# Patient Record
Sex: FEMALE | Race: WHITE | NOT HISPANIC OR LATINO | URBAN - METROPOLITAN AREA
[De-identification: names, ages, dates, MRNs, and addresses within clinical notes are randomized per-mention and may not be internally consistent; named-entity substitution may affect disease eponyms.]

---

## 2024-10-09 ENCOUNTER — OFFICE VISIT (OUTPATIENT)
Dept: ORTHOPEDICS | Facility: CLINIC | Age: 59
End: 2024-10-09
Payer: MEDICAID

## 2024-10-09 ENCOUNTER — TELEPHONE (OUTPATIENT)
Dept: ORTHOPEDICS | Facility: CLINIC | Age: 59
End: 2024-10-09
Payer: MEDICAID

## 2024-10-09 VITALS — HEIGHT: 66 IN

## 2024-10-09 DIAGNOSIS — S46.012D TRAUMATIC INCOMPLETE TEAR OF LEFT ROTATOR CUFF, SUBSEQUENT ENCOUNTER: ICD-10-CM

## 2024-10-09 DIAGNOSIS — M25.512 LEFT SHOULDER PAIN, UNSPECIFIED CHRONICITY: Primary | ICD-10-CM

## 2024-10-09 PROBLEM — S46.012A TRAUMATIC INCOMPLETE TEAR OF LEFT ROTATOR CUFF: Status: ACTIVE | Noted: 2024-10-09

## 2024-10-09 PROCEDURE — 1159F MED LIST DOCD IN RCRD: CPT | Mod: CPTII,,, | Performed by: ORTHOPAEDIC SURGERY

## 2024-10-09 PROCEDURE — 99999 PR PBB SHADOW E&M-NEW PATIENT-LVL III: CPT | Mod: PBBFAC,,, | Performed by: ORTHOPAEDIC SURGERY

## 2024-10-09 PROCEDURE — 99204 OFFICE O/P NEW MOD 45 MIN: CPT | Mod: S$PBB,,, | Performed by: ORTHOPAEDIC SURGERY

## 2024-10-09 PROCEDURE — 99203 OFFICE O/P NEW LOW 30 MIN: CPT | Mod: PBBFAC,PN | Performed by: ORTHOPAEDIC SURGERY

## 2024-10-09 RX ORDER — FLUTICASONE PROPIONATE 50 MCG
1 SPRAY, SUSPENSION (ML) NASAL
COMMUNITY
Start: 2024-05-28

## 2024-10-09 RX ORDER — LORATADINE 10 MG/1
10 TABLET ORAL
COMMUNITY
Start: 2024-09-25

## 2024-10-09 RX ORDER — CEFAZOLIN SODIUM 2 G/50ML
2 SOLUTION INTRAVENOUS
OUTPATIENT
Start: 2024-10-09

## 2024-10-09 RX ORDER — TOPIRAMATE 100 MG/1
100 TABLET, FILM COATED ORAL
COMMUNITY

## 2024-10-09 RX ORDER — ERGOCALCIFEROL 1.25 MG/1
50000 CAPSULE ORAL
COMMUNITY
Start: 2024-10-03

## 2024-10-09 RX ORDER — TIZANIDINE 4 MG/1
4 TABLET ORAL NIGHTLY PRN
Qty: 30 TABLET | Refills: 2 | Status: SHIPPED | OUTPATIENT
Start: 2024-10-09 | End: 2025-01-07
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
## 2024-11-20 ENCOUNTER — TELEPHONE (OUTPATIENT)
Dept: ORTHOPEDICS | Facility: CLINIC | Age: 59
End: 2024-11-20
Payer: MEDICAID

## 2024-11-20 NOTE — TELEPHONE ENCOUNTER
----- Message from Malia sent at 11/20/2024  3:49 PM CST -----  Regarding: Call back  Contact: [phone]  Type:  Patient Returning Call    Who Called: PT   Who Left Message for Patient: Nurse   Does the patient know what this is regarding?: Yes   Would the patient rather a call back or a response via [facility]? Call back   Best Call Back Number: [phone]  Additional Information:  
Spoke with patient. Informed of appointment time change.   
home

## 2024-11-20 NOTE — TELEPHONE ENCOUNTER
Voicemail not set up/ unable to leave VM. Pts appointment time was moved up to 8:30 AM for her post op with Love Santos PA-C.

## 2024-12-24 NOTE — PROGRESS NOTES
Subjective:      Patient ID: Meredith Lyle is a 59 y.o. female.    Chief Complaint: No chief complaint on file.      HPI: Meredith Lyle is here for a 2 week postop visit.     Surgery: left shoulder scope   Date of Surgery: 12/11/2024 (2 weeks)  Surgeon: Dr. Aleman    She is doing well.   Pain has been controlled.   Post surgical complaints include:  None.   No n/v, fever, chills, SOB, leg pain, surgical site irritation.    Patient presents today with her cousin      Past Medical History:   Diagnosis Date    Allergies     Traumatic incomplete tear of left rotator cuff     Vitamin D deficiency        Current Outpatient Medications:     ergocalciferol (ERGOCALCIFEROL) 50,000 unit Cap, Take 50,000 Units by mouth every 7 days. Weekly Wednesday; instructed to hold for sx, Disp: , Rfl:     fluticasone propionate (FLONASE) 50 mcg/actuation nasal spray, 1 spray by Each Nostril route once daily., Disp: , Rfl:     multivit-min/iron/FA/vit K/lut (CENTRUM SILVER WOMEN ORAL), Take 2 tablets by mouth Daily. Gummies, instructed to hold for sx, Disp: , Rfl:     multivitamin with minerals (HAIR,SKIN AND NAILS ORAL), Take 2 tablets by mouth Daily. Gummies; instructed to hold for sx, Disp: , Rfl:     oxyCODONE-acetaminophen (PERCOCET) 7.5-325 mg per tablet, Take 1 tablet by mouth every 6 (six) hours as needed., Disp: 28 tablet, Rfl: 0    tiZANidine (ZANAFLEX) 4 MG tablet, Take 1 tablet (4 mg total) by mouth nightly as needed (pain)., Disp: 30 tablet, Rfl: 2    topiramate (TOPAMAX) 100 MG tablet, Take 100 mg by mouth once daily., Disp: , Rfl:   Review of patient's allergies indicates:  No Known Allergies    There were no vitals taken for this visit.    Review of Systems   Constitutional: Negative for chills and fever.   HENT:  Negative for congestion and sore throat.    Eyes:  Negative for discharge and double vision.   Cardiovascular:  Negative for chest pain, palpitations and syncope.   Respiratory:  Negative for cough and  shortness of breath.    Endocrine: Negative for cold intolerance and heat intolerance.   Skin:  Negative for dry skin and rash.   Musculoskeletal:  Positive for joint pain, joint swelling and stiffness.   Gastrointestinal:  Negative for abdominal pain, nausea and vomiting.   Neurological:  Negative for focal weakness, numbness and paresthesias.         Objective:    Ortho Exam   left shoulder:  Significant for 2 incisions.   Wound margins are well approximated and healing nicely.   Sutures present  No redness, warmth, drainage, or other signs of infection.   minimal swelling.   PROM shoulder ABD 85, FE 85, ER 30.   AROM shoulder not tested    Sensation distally intact.   Pulses present.    GEN: Well developed, well nourished. AAOX3. No acute distress.   Breathing unlabored.  Mood and affect pleasant.     Imaging: None      Assessment/Plan:       1. S/P arthroscopy of left shoulder    2. Left shoulder pain, unspecified chronicity    3. Traumatic incomplete tear of left rotator cuff, subsequent encounter            Wound care: Sutures removed today at bedside. Regular wound care explained with soap and water.   Pain Management: continue current pain regimen  I explained symptoms will continue to resolve with time.  DME:  Okay to discontinue sling as tolerated  Therapy: orders placed today for PT  HEP 91205: Love Santos PA-C, instructed and demonstrated a shoulder ROM HEP. The patient then demonstrated understanding of exercises and proper technique. This program was performed for 10 minutes.   Work status: Okay to eat, write, do computer work. No lifting, overhead activities, pushing, pulling.  Okay to actively use arm as tolerated  Follow up: in 4 weeks with Love Santos PA-C or Dr Aleman    All of the patient's questions were answered and the patient will contact us if they have any questions or concerns in the interim.      Love Santos PA-C  [facility]  Orthopedic Surgery

## 2024-12-26 ENCOUNTER — OFFICE VISIT (OUTPATIENT)
Dept: ORTHOPEDICS | Facility: CLINIC | Age: 59
End: 2024-12-26
Payer: MEDICAID

## 2024-12-26 VITALS — HEIGHT: 66 IN | BODY MASS INDEX: 27.07 KG/M2 | WEIGHT: 168.44 LBS

## 2024-12-26 DIAGNOSIS — Z98.890 S/P ARTHROSCOPY OF LEFT SHOULDER: Primary | ICD-10-CM

## 2024-12-26 DIAGNOSIS — M25.512 LEFT SHOULDER PAIN, UNSPECIFIED CHRONICITY: ICD-10-CM

## 2024-12-26 DIAGNOSIS — S46.012D TRAUMATIC INCOMPLETE TEAR OF LEFT ROTATOR CUFF, SUBSEQUENT ENCOUNTER: ICD-10-CM

## 2024-12-26 PROCEDURE — 99213 OFFICE O/P EST LOW 20 MIN: CPT | Mod: PBBFAC,PN

## 2024-12-26 PROCEDURE — 99999 PR PBB SHADOW E&M-EST. PATIENT-LVL III: CPT | Mod: PBBFAC,,,

## 2025-01-10 ENCOUNTER — CLINICAL SUPPORT (OUTPATIENT)
Dept: REHABILITATION | Facility: HOSPITAL | Age: 60
End: 2025-01-10
Payer: MEDICAID

## 2025-01-10 DIAGNOSIS — Z98.890 S/P ARTHROSCOPY OF LEFT SHOULDER: ICD-10-CM

## 2025-01-10 DIAGNOSIS — M25.512 LEFT SHOULDER PAIN, UNSPECIFIED CHRONICITY: ICD-10-CM

## 2025-01-10 DIAGNOSIS — R68.89 DECREASED FUNCTIONAL ACTIVITY TOLERANCE: ICD-10-CM

## 2025-01-10 DIAGNOSIS — M25.612 DECREASED ROM OF LEFT SHOULDER: Primary | ICD-10-CM

## 2025-01-10 DIAGNOSIS — S46.012D TRAUMATIC INCOMPLETE TEAR OF LEFT ROTATOR CUFF, SUBSEQUENT ENCOUNTER: ICD-10-CM

## 2025-01-10 DIAGNOSIS — M62.81 MUSCLE WEAKNESS OF LEFT UPPER EXTREMITY: ICD-10-CM

## 2025-01-10 PROCEDURE — 97110 THERAPEUTIC EXERCISES: CPT | Mod: PN

## 2025-01-10 PROCEDURE — 97161 PT EVAL LOW COMPLEX 20 MIN: CPT | Mod: PN
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
## 2025-01-28 NOTE — PROGRESS NOTES
"Subjective:      Patient ID: Meredith Lyle is a 59 y.o. female.    Chief Complaint: Post-op Evaluation of the Left Shoulder      HPI: Meredith Lyle is here for a 2 week postop visit.     Surgery: left shoulder scope   Date of Surgery: 12/11/2024 (6 weeks)  Surgeon: Dr. Aleman    She is doing well.   Pain has been controlled.   Post surgical complaints include: decreased ROM, although continues improve  No n/v, fever, chills, SOB, leg pain, surgical site irritation.        Past Medical History:   Diagnosis Date    Allergies     Traumatic incomplete tear of left rotator cuff     Vitamin D deficiency        Current Outpatient Medications:     ergocalciferol (ERGOCALCIFEROL) 50,000 unit Cap, Take 50,000 Units by mouth every 7 days. Weekly Wednesday; instructed to hold for sx, Disp: , Rfl:     fluticasone propionate (FLONASE) 50 mcg/actuation nasal spray, 1 spray by Each Nostril route once daily., Disp: , Rfl:     multivit-min/iron/FA/vit K/lut (CENTRUM SILVER WOMEN ORAL), Take 2 tablets by mouth Daily. Gummies, instructed to hold for sx, Disp: , Rfl:     multivitamin with minerals (HAIR,SKIN AND NAILS ORAL), Take 2 tablets by mouth Daily. Gummies; instructed to hold for sx, Disp: , Rfl:     oxyCODONE-acetaminophen (PERCOCET) 7.5-325 mg per tablet, Take 1 tablet by mouth every 6 (six) hours as needed., Disp: 28 tablet, Rfl: 0    topiramate (TOPAMAX) 100 MG tablet, Take 100 mg by mouth once daily., Disp: , Rfl:   Review of patient's allergies indicates:  No Known Allergies    Ht 5' 6" (1.676 m)   Wt 76.4 kg (168 lb 6.9 oz)   BMI 27.19 kg/m²     Review of Systems   Constitutional: Negative for chills and fever.   HENT:  Negative for congestion and sore throat.    Eyes:  Negative for discharge and double vision.   Cardiovascular:  Negative for chest pain, palpitations and syncope.   Respiratory:  Negative for cough and shortness of breath.    Endocrine: Negative for cold intolerance and heat intolerance.   Skin:  " Negative for dry skin and rash.   Musculoskeletal:  Positive for joint pain, joint swelling and stiffness.   Gastrointestinal:  Negative for abdominal pain, nausea and vomiting.   Neurological:  Negative for focal weakness, numbness and paresthesias.         Objective:    Ortho Exam   left shoulder:  Significant for 2 incisions.   Wound margins are well approximated and healing nicely.   No redness, warmth, drainage, or other signs of infection.   minimal swelling.   AROM shoulder , , ER 30.   Sensation distally intact.   Pulses present.    GEN: Well developed, well nourished. AAOX3. No acute distress.   Breathing unlabored.  Mood and affect pleasant.     Imaging: None      Assessment/Plan:       1. S/P arthroscopy of left shoulder    2. Left shoulder pain, unspecified chronicity    3. Traumatic incomplete tear of left rotator cuff, subsequent encounter          Wound care: Regular wound care explained with soap and water.   Pain Management: continue current pain regimen  I explained symptoms will continue to resolve with time.  Therapy: continue PT  Work status: ok to increase activity as tolerated within limits of pain  Follow up: in 6 weeks     All of the patient's questions were answered and the patient will contact us if they have any questions or concerns in the interim.      Love Santos PA-C  [facility]  Orthopedic Surgery

## 2025-01-30 ENCOUNTER — OFFICE VISIT (OUTPATIENT)
Dept: ORTHOPEDICS | Facility: CLINIC | Age: 60
End: 2025-01-30
Payer: MEDICAID

## 2025-01-30 VITALS — HEIGHT: 66 IN | BODY MASS INDEX: 27.07 KG/M2 | WEIGHT: 168.44 LBS

## 2025-01-30 DIAGNOSIS — S46.012D TRAUMATIC INCOMPLETE TEAR OF LEFT ROTATOR CUFF, SUBSEQUENT ENCOUNTER: ICD-10-CM

## 2025-01-30 DIAGNOSIS — M25.512 LEFT SHOULDER PAIN, UNSPECIFIED CHRONICITY: ICD-10-CM

## 2025-01-30 DIAGNOSIS — Z98.890 S/P ARTHROSCOPY OF LEFT SHOULDER: Primary | ICD-10-CM

## 2025-01-30 PROCEDURE — 99999 PR PBB SHADOW E&M-EST. PATIENT-LVL III: CPT | Mod: PBBFAC,,,

## 2025-01-30 PROCEDURE — 99213 OFFICE O/P EST LOW 20 MIN: CPT | Mod: PBBFAC,PN

## 2025-02-13 ENCOUNTER — CLINICAL SUPPORT (OUTPATIENT)
Dept: REHABILITATION | Facility: HOSPITAL | Age: 60
End: 2025-02-13
Payer: MEDICAID

## 2025-02-13 DIAGNOSIS — R68.89 DECREASED FUNCTIONAL ACTIVITY TOLERANCE: ICD-10-CM

## 2025-02-13 DIAGNOSIS — M62.81 MUSCLE WEAKNESS OF LEFT UPPER EXTREMITY: ICD-10-CM

## 2025-02-13 DIAGNOSIS — M25.612 DECREASED ROM OF LEFT SHOULDER: Primary | ICD-10-CM

## 2025-02-13 PROCEDURE — 97110 THERAPEUTIC EXERCISES: CPT | Mod: PN,CQ

## 2025-02-13 NOTE — PROGRESS NOTES
"[facility]   Physical Therapy Treatment Note     Name: Meredith Lyle  Clinic Number: [id number]    Therapy Diagnosis:   Encounter Diagnoses   Name Primary?    Decreased ROM of left shoulder Yes    Muscle weakness of left upper extremity     Decreased functional activity tolerance      Physician: Love Santos PA-C    Visit Date: 2/13/2025    Physician Orders: PT Eval and Treat   Medical Diagnosis from Referral:   M25.512 (ICD-10-CM) - Left shoulder pain, unspecified chronicity   S46.012D (ICD-10-CM) - Traumatic incomplete tear of left rotator cuff, subsequent encounter   Z98.890 (ICD-10-CM) - S/P arthroscopy of left shoulder      Evaluation Date: 1/10/2025  Authorization Period Expiration: 12/31/25  Plan of Care Expiration: 5/10/25  Visit # / Visits authorized: 1/ 20    Progress Note Due: 2/10/25  FOTO: 1/1     Precautions: Standard  Date of Procedure: 12/11/2024, 9 weeks and 1 days post-op  Procedure: Procedure(s) (LRB):  ARTHROSCOPY, SHOULDER, WITH SUBACROMIAL SPACE DECOMPRESSION (Left)    Visit #: 1 of 20  PTA Visit #: 1/5  Time In: 9:00 am  Time Out: 9:55 am  Total Billable Time: 55 minutes    Subjective     Pt reports: feeling fine, not much pain.  She was compliant with home exercise program.  Response to previous treatment: first treatment day  Functional change: ongoing    Pain: 1-2/10  Location: left shoulder      Objective     Meredith received therapeutic exercises to develop strength, endurance, ROM, flexibility, and posture for 55 minutes including:  Pulley Flexion 3 min x 3 sec  Pulley Scaption 3 min x 3 sec  Wall slide (pillow case thumb up) 2x10x3"  Standing shoulder scaption 2# LUE (thumb up)  Supine Shoulder Flexion with 2# wand 3x10  Supine Shoulder Chest Press with 2# wand 3x10  Prone Row with 2# dumbbell 2x10  Prone T with 2# dumbbell 2x10  Prone I with 2# dumbbell 2x10  Prone Y 2x10  manual therapy techniques: Joint mobilizations and Soft tissue Mobilization were " applied to the: Left shoulder including: PROM Flex/Abd/ER/IR, Joint mobs, Oscillation        Home Exercises Provided and Patient Education Provided     Written Home Exercises Provided: Patient instructed to cont prior HEP.  Exercises were reviewed and Meredith was able to demonstrate them prior to the end of the session.  Meredith demonstrated good  understanding of the education provided.     Education provided:    HEP    Assessment     Patient came for her first therapy treatment after the initial evaluation on 1/10/2025, reporting Left shoulder has been feeling fine, not much pain, she has been working on HEP. Focus on improving flexibility/ROM/muscles strength for functional mobility and pain relief. Verbal and tactile instructions to ensure patient perform all exercises with good form, proper technique, and muscle activation. Reporting some muscle soreness post treatment. Instructed her to cont to work on her HEP, she verbalized understanding. Will continue to progress per patient tolerance.      Meredith Is progressing well towards her goals.   Pt prognosis is Good.     Pt will continue to benefit from skilled outpatient physical therapy to address the deficits listed in the problem list box on initial evaluation, provide pt/family education and to maximize pt's level of independence in the home and community environment.     Pt's spiritual, cultural and educational needs considered and pt agreeable to plan of care and goals.     Anticipated barriers to physical therapy: none    Goals:   Short Term Goals: 4 weeks  1.Report decreased in pain at worse less than  <   / =  4  /10  to increase tolerance for functional mobility. On going  2. Pt to improve L shoulder range of motion by 25% to allow for improved functional mobility to allow for improvement in IADLs. On going  3. Increased L UE MMT 1/2 grade to increase tolerance for ADL and work activities. On going  4. Pt to report be conscious of impaired sitting and  standing posture daily to decrease pain. On going  5. Pt to tolerate HEP to improve ROM and independence with ADL's. On going     Long Term Goals: 8 weeks  1.Report decreased in pain at worse less than  <   / =  2  /10  to increase tolerance for functional mobility. On going  2.  Patient will demonstrate improved overall function per FOTO Survey to CI = at least 1% but < 20% impaired, limited or restricted score or less.On going  3.Increased L UE MMT 1 grade to increase tolerance for ADL and work activities.On going  4. Pt to report and demonstrate proper posture in standing and sitting to decrease pain. On going  5. Pt to be Independent with HEP to improve ROM and independence with ADL's.On going  6. Pt to improve L shoulder range of motion by 75% to allow for improved functional mobility to allow for improvement in IADLs. On going       Plan     Plan of care Certification: 1/10/2025 to 5/10/25.     Outpatient Physical Therapy 2 times weekly for 12 weeks to include the following interventions: Electrical Stimulation , Manual Therapy, Moist Heat/ Ice, Neuromuscular Re-ed, Patient Education, Self Care, Therapeutic Activities, and Therapeutic Exercise. Dry needling    PT/PTA met face to face to discuss pt's treatment plan and progress towards established goals. Pt will be seen by a physical therapist minimally every 6th visit or every 30 days.      David To, PTA   2/13/2025

## 2025-02-15 ENCOUNTER — DOCUMENTATION ONLY (OUTPATIENT)
Dept: REHABILITATION | Facility: HOSPITAL | Age: 60
End: 2025-02-15
Payer: MEDICAID

## 2025-02-15 NOTE — PROGRESS NOTES
[facility]                                 PT/PTA conferance        PT/PTA met face to face to discuss pt's treatment plan and progress towards established goals. Pt will be seen by a physical therapist minimally every 6th visit or every 30 days.      David To, PTA  2/15/2025

## 2025-02-20 ENCOUNTER — PATIENT MESSAGE (OUTPATIENT)
Dept: ORTHOPEDICS | Facility: CLINIC | Age: 60
End: 2025-02-20
Payer: MEDICAID

## 2025-03-20 ENCOUNTER — OFFICE VISIT (OUTPATIENT)
Dept: ORTHOPEDICS | Facility: CLINIC | Age: 60
End: 2025-03-20
Payer: MEDICAID

## 2025-03-20 ENCOUNTER — DOCUMENTATION ONLY (OUTPATIENT)
Dept: REHABILITATION | Facility: HOSPITAL | Age: 60
End: 2025-03-20
Payer: MEDICAID

## 2025-03-20 DIAGNOSIS — G89.29 CHRONIC LEFT SHOULDER PAIN: ICD-10-CM

## 2025-03-20 DIAGNOSIS — M25.512 CHRONIC LEFT SHOULDER PAIN: ICD-10-CM

## 2025-03-20 DIAGNOSIS — Z98.890 S/P ARTHROSCOPY OF LEFT SHOULDER: Primary | ICD-10-CM

## 2025-03-20 DIAGNOSIS — R52 PAIN: Primary | ICD-10-CM

## 2025-03-20 PROCEDURE — 99213 OFFICE O/P EST LOW 20 MIN: CPT | Mod: PBBFAC,PN

## 2025-03-20 PROCEDURE — 99999 PR PBB SHADOW E&M-EST. PATIENT-LVL III: CPT | Mod: PBBFAC,,,

## 2025-03-20 NOTE — PROGRESS NOTES
[facility]                                 No Shows Therapy Appointment     Meredith Lyle  MRN: [id number]    Patient no shows to today's therapy appointment on 3/20/2025.    David To, PTA  3/20/2025

## 2025-03-20 NOTE — PROGRESS NOTES
Subjective:      Patient ID: Meredith Lyle is a 60 y.o. female.    Chief Complaint: Post-op Follow Up    HPI: Meredith Lyle returns for a 3 months post-op visit following: left shoulder arthroscopy, subacromial decompression, and debridement of partial tear rotator cuff (date of surgery 12/11/2024) with Dr. Aleman. Overall, the patient is doing well with no complaints of fever, chills, nausea, vomiting, SOB, chest pains, or drainage to surgical incision. The patient reports that pain has been managed with medication. She has not begun formal PT/OT yet, but maintains compliance with activity restrictions. Post-operative complaints include:  Occasional discomfort when trying to lie on the shoulder.        PAST MEDICAL HISTORY:    Past Medical History:   Diagnosis Date    Allergies     Traumatic incomplete tear of left rotator cuff     Vitamin D deficiency      MEDICATIONS:   Current Medications[1]    ALLERGIES:   Review of patient's allergies indicates:  No Known Allergies    Review of Systems:  Constitution: Negative for chills, fever and night sweats.   HENT: Negative for congestion and headaches.    Eyes: Negative for blurred vision or vision loss.  Cardiovascular: Negative for chest pain and syncope.   Respiratory: Negative for cough and shortness of breath.    Endocrine: Negative for polydipsia, polyphagia and polyuria.   Hematologic/Lymphatic: Negative for bleeding problem. Does not bruise/bleed easily.   Skin: Negative for dry skin, itching and rash.   Musculoskeletal: See HPI.   Gastrointestinal: Negative for abdominal pain and bowel incontinence.   Genitourinary: Negative for bladder incontinence and nocturia.   Neurological: Negative for disturbances in coordination, loss of balance and seizures.   Psychiatric/Behavioral: Negative for depression. The patient does not have insomnia.    Allergic/Immunologic: Negative for hives and persistent infections.        Objective:      There were no vitals filed  for this visit.    PHYSICAL EXAM:  General: Alert & oriented x3, well-developed and well-nourished, in no acute distress, sitting comfortably in the exam room.  Skin: Warm and dry. Capillary refill less than 2 seconds.   Head: Normocephalic and atraumatic.   Eyes: Sclera appear normal.   Nose: No deformities seen.   Ears: No deformities seen.   Neck: No tracheal deviation present.   Pulmonary/Chest: Breathing unlabored.   Neurological: Alert and oriented to person, place, and time.   Psychiatric: Mood is pleasant and affect appropriate.       LEFT SHOULDER        Observation/Inspection:    Surgical incision is well healed with appropriate scar formation.  No redness, warmth, drainage, or other signs of infection.        Palpation:    No tenderness to palpation to bony prominences and soft tissues throughout.        Range of Motion:   Active Forward Flexion:   180°   Active Abduction:   160°   Active External Rotation:   30°         Strength Testing:  Strength not tested today        Neurovascular Exam Bilateral UEs:  Sensation intact to light touch in the distal median, radial, and ulnar nerve distributions bilaterally.  Capillary refill intact <2 seconds in all digits bilaterally    Imaging:  None today.        Assessment:       1. S/P arthroscopy of left shoulder    2. Chronic left shoulder pain        Plan:          3 months s/p left shoulder arthroscopy, subacromial decompression, and debridement of partial tear rotator cuff     Overall patient is doing well post-operatively.    Medications:  Continue with OTC Tylenol and/or NSAIDs as needed for pain.   Antibiotics:  None prescribed today.  No evidence of wound infection.  HEP:  Continue with shoulder ROM HEP.  Pain Management: Ice compress to the affected area 2-3x a day for 15-20 minutes as needed for pain management.      Follow-Up:  As needed.    All of the patient's questions were answered and the patient will contact us if they have any questions or  concerns in the interim.    Sheeba Mcgregor PA-C  [facility]  Orthopedic Surgery    Medical Dictation software was used during the dictation of portions or the entirety of this medical record.  Phonetic or grammatic errors may exist due to the use of this software. For clarification, refer to the author of the document.            [1]   Current Outpatient Medications:     ergocalciferol (ERGOCALCIFEROL) 50,000 unit Cap, Take 50,000 Units by mouth every 7 days. Weekly Wednesday; instructed to hold for sx, Disp: , Rfl:     fluticasone propionate (FLONASE) 50 mcg/actuation nasal spray, 1 spray by Each Nostril route once daily., Disp: , Rfl:     multivit-min/iron/FA/vit K/lut (CENTRUM SILVER WOMEN ORAL), Take 2 tablets by mouth Daily. Gummies, instructed to hold for sx, Disp: , Rfl:     multivitamin with minerals (HAIR,SKIN AND NAILS ORAL), Take 2 tablets by mouth Daily. Gummies; instructed to hold for sx, Disp: , Rfl:     oxyCODONE-acetaminophen (PERCOCET) 7.5-325 mg per tablet, Take 1 tablet by mouth every 6 (six) hours as needed. (Patient not taking: Reported on 3/20/2025), Disp: 28 tablet, Rfl: 0    topiramate (TOPAMAX) 100 MG tablet, Take 100 mg by mouth once daily., Disp: , Rfl:

## 2025-03-27 ENCOUNTER — OFFICE VISIT (OUTPATIENT)
Dept: ORTHOPEDICS | Facility: CLINIC | Age: 60
End: 2025-03-27
Payer: MEDICAID

## 2025-03-27 DIAGNOSIS — M18.11 ARTHRITIS OF CARPOMETACARPAL (CMC) JOINT OF RIGHT THUMB: ICD-10-CM

## 2025-03-27 DIAGNOSIS — M18.12 ARTHRITIS OF CARPOMETACARPAL (CMC) JOINT OF LEFT THUMB: Primary | ICD-10-CM

## 2025-03-27 PROCEDURE — 1159F MED LIST DOCD IN RCRD: CPT | Mod: CPTII,,,

## 2025-03-27 PROCEDURE — 99214 OFFICE O/P EST MOD 30 MIN: CPT | Mod: S$PBB,,,

## 2025-03-27 PROCEDURE — 99999 PR PBB SHADOW E&M-EST. PATIENT-LVL III: CPT | Mod: PBBFAC,,,

## 2025-03-27 PROCEDURE — 1160F RVW MEDS BY RX/DR IN RCRD: CPT | Mod: CPTII,,,

## 2025-03-27 PROCEDURE — 99213 OFFICE O/P EST LOW 20 MIN: CPT | Mod: PBBFAC,PN

## 2025-03-27 RX ORDER — DICLOFENAC SODIUM 10 MG/G
2 GEL TOPICAL 3 TIMES DAILY
Qty: 50 G | Refills: 2 | Status: SHIPPED | OUTPATIENT
Start: 2025-03-27

## 2025-03-27 NOTE — PROGRESS NOTES
Subjective:      Patient ID: Meredith Lyle is a 60 y.o. female.    Chief Complaint: Pain of the Left Hand and Pain of the Right Hand      HPI: Meredith Lyle is a 60 y.o. right hand dominant female who presents to clinic for bilateral thumb pain (R = L). The patient denies known MELINDA.  The pain started a few months ago and is becoming progressively worse.  Pain is located over (points to) base of the thumb. She reports that the pain is a 3 /10 aching pain today. Pain is 5/10 at its worst.  The pain is aggravated by use of the hands and gripping objects. Denies numbness, tingling, radiation. The pain is affecting ADLs and limiting desired level of activity. There is not a history of previous surgery to the hands. Previous treatments include none.       PAST MEDICAL HISTORY:    Past Medical History:   Diagnosis Date    Allergies     Traumatic incomplete tear of left rotator cuff     Vitamin D deficiency      PAST SURGICAL HISTORY:    Past Surgical History:   Procedure Laterality Date    ARTHROSCOPY OF SHOULDER WITH DECOMPRESSION OF SUBACROMIAL SPACE Left 12/11/2024    Procedure: ARTHROSCOPY, SHOULDER, WITH SUBACROMIAL SPACE DECOMPRESSION;  Surgeon: Amando Aleman Jr., MD;  Location: [facility];  Service: Orthopedics;  Laterality: Left;  need opus system    BILATERAL TUBAL LIGATION      COLONOSCOPY  2023     FAMILY HISTORY:    No family history on file.    SOCIAL HISTORY:    Social History     Occupational History    Not on file   Tobacco Use    Smoking status: Never    Smokeless tobacco: Never   Substance and Sexual Activity    Alcohol use: Yes     Comment: on occasion    Drug use: Never    Sexual activity: Not Currently      MEDICATIONS:   Current Medications[1]    ALLERGIES:   Review of patient's allergies indicates:  No Known Allergies    Review of Systems:  Constitution: Negative for chills, fever and night sweats.   HENT: Negative for congestion and headaches.    Eyes: Negative for blurred vision or vision  loss.  Cardiovascular: Negative for chest pain and syncope.   Respiratory: Negative for cough and shortness of breath.    Endocrine: Negative for polydipsia, polyphagia and polyuria.   Hematologic/Lymphatic: Negative for bleeding problem. Does not bruise/bleed easily.   Skin: Negative for dry skin, itching and rash.   Musculoskeletal: See HPI.   Gastrointestinal: Negative for abdominal pain and bowel incontinence.   Genitourinary: Negative for bladder incontinence and nocturia.   Neurological: Negative for disturbances in coordination, loss of balance and seizures.   Psychiatric/Behavioral: Negative for depression. The patient does not have insomnia.    Allergic/Immunologic: Negative for hives and persistent infections.          Objective:        There were no vitals filed for this visit.    PHYSICAL EXAM:  General: Alert & oriented x3, well-developed and well-nourished, in no acute distress, sitting comfortably in the exam room.  Skin: Warm and dry. Capillary refill less than 2 seconds.   Head: Normocephalic and atraumatic.   Eyes: Sclera appear normal.   Nose: No deformities seen.   Ears: No deformities seen.   Neck: No tracheal deviation present.   Pulmonary/Chest: Breathing unlabored.   Neurological: Alert and oriented to person, place, and time.   Psychiatric: Mood is pleasant and affect appropriate.       BILATERAL HAND/WRIST:        Observation/Inspection:    No evidence of visible deformity, swelling, discoloration, scars, or atrophy.         Palpation:   No tenderness to palpation to bony prominences and soft tissues throughout.        Range of Motion:  Wrist: Full to wrist flexion, extension, radial, and ulnar deviation without pain or difficulty.  Digits: Full to digit MCP, PIP, and DIP flexion and extension without pain or difficulty.        Special Tests:     Right   Left  Finkelstein's Test  Negative      Negative  Tinel's Test - Carpal Tunnel Negative      Negative  Tinel's Test - Cubital  Tunnel Negative      Negative  CMC Grind    Positive        Positive         Strength:   Normal 5/5 strength in all tested muscle groups.         Neurovascular Exam:  Digits warm and well perfused, brisk capillary refill <3 seconds throughout  NVI motor/LTS to median, radial, and ulnar nerves, radial pulse 2+    Imaging:   X-Rays: 3 views of bilateral hand obtained in the Orthopedic clinic today, and independently reviewed, show: There is loss of joint space at the 1st carpometacarpal joint bilaterally with associated subluxation.  There is no acute fracture, dislocation, or bony erosion.  There is a remote deformity of the ulnar styloid process on the left.        Assessment:       1. Arthritis of carpometacarpal (CMC) joint of left thumb    2. Arthritis of carpometacarpal (CMC) joint of right thumb        Plan:       Orders Placed This Encounter    diclofenac sodium (VOLTAREN) 1 % Gel       I explained the nature of the problem to the patient.     I discussed at length with the patient all the different treatment options available for her bilateral hands including anti-inflammatories, acetaminophen, bracing, rest, ice, heat, occupational therapy, and corticosteroid injections. I explained the potential role of surgery in the treatment of this condition. The patient understands that if non-surgical measures do not adequately control symptoms, surgery will be considered in the future.     Medications:  Prescription for Voltaren Gel TID to affected area as needed provided today for PRN pain management. Continue to take OTC Tylenol Arthritis and/or NSAIDs as needed for pain management.   Procedures:  None today. Consider CSI if symptoms worsen.  DME:  bilateral 3D braces provided today for patient to wear with activities.    Pain Management: Encouraged patient to apply heat compress to the affected area 2-3x a day for 15-20 minutes as needed for pain management.      Follow-Up:  2-3 months for follow-up.    All of the  patient's questions were answered and the patient will contact us if they have any questions or concerns in the interim.      Sheeba Mcgregor PA-C  [facility]  Orthopedic Surgery    Medical Dictation software was used during the dictation of portions or the entirety of this medical record.  Phonetic or grammatic errors may exist due to the use of this software. For clarification, refer to the author of the document.             [1]   Current Outpatient Medications:     diclofenac sodium (VOLTAREN) 1 % Gel, Apply 2 g topically 3 (three) times daily. To affected area, Disp: 50 g, Rfl: 2    ergocalciferol (ERGOCALCIFEROL) 50,000 unit Cap, Take 50,000 Units by mouth every 7 days. Weekly Wednesday; instructed to hold for sx, Disp: , Rfl:     fluticasone propionate (FLONASE) 50 mcg/actuation nasal spray, 1 spray by Each Nostril route once daily., Disp: , Rfl:     multivit-min/iron/FA/vit K/lut (CENTRUM SILVER WOMEN ORAL), Take 2 tablets by mouth Daily. Gummies, instructed to hold for sx, Disp: , Rfl:     multivitamin with minerals (HAIR,SKIN AND NAILS ORAL), Take 2 tablets by mouth Daily. Gummies; instructed to hold for sx, Disp: , Rfl:     oxyCODONE-acetaminophen (PERCOCET) 7.5-325 mg per tablet, Take 1 tablet by mouth every 6 (six) hours as needed. (Patient not taking: Reported on 3/20/2025), Disp: 28 tablet, Rfl: 0    topiramate (TOPAMAX) 100 MG tablet, Take 100 mg by mouth once daily., Disp: , Rfl:

## 2025-07-03 ENCOUNTER — OFFICE VISIT (OUTPATIENT)
Dept: ORTHOPEDICS | Facility: CLINIC | Age: 60
End: 2025-07-03
Payer: MEDICAID

## 2025-07-03 DIAGNOSIS — M18.12 ARTHRITIS OF CARPOMETACARPAL (CMC) JOINT OF LEFT THUMB: Primary | ICD-10-CM

## 2025-07-03 DIAGNOSIS — M18.11 ARTHRITIS OF CARPOMETACARPAL (CMC) JOINT OF RIGHT THUMB: ICD-10-CM

## 2025-07-03 PROCEDURE — 99212 OFFICE O/P EST SF 10 MIN: CPT | Mod: PBBFAC,PN

## 2025-07-03 PROCEDURE — 99999 PR PBB SHADOW E&M-EST. PATIENT-LVL II: CPT | Mod: PBBFAC,,,

## 2025-07-03 RX ORDER — DICLOFENAC SODIUM 10 MG/G
2 GEL TOPICAL 3 TIMES DAILY
Qty: 50 G | Refills: 2 | Status: SHIPPED | OUTPATIENT
Start: 2025-07-03

## 2025-07-03 NOTE — PROGRESS NOTES
Subjective:      Patient ID: Meredith Lyle is a 60 y.o. female.    Chief Complaint: Pain of the Right Hand and Pain of the Left Hand      HPI: Meredith Lyle is a 60 y.o. right hand dominant female who presents to clinic for follow up of bilateral thumb CMC arthritis and pain (R = L). Patient was last seen by myself on 03/27/2025 for this.  She denies a history of trauma, but symptoms have been present for months. She reports that the pain is a 5/10 pain today. The pain is aggravated by use of the hands and gripping objects. Denies numbness, tingling, radiation.  Previous treatments include: Voltaren Gel TID, 3D brace with activity, activity modifications, heat with good relief. The pain is affecting ADLs and limiting desired level of activity.     PAST MEDICAL HISTORY:    Past Medical History:   Diagnosis Date    Allergies     Traumatic incomplete tear of left rotator cuff     Vitamin D deficiency      MEDICATIONS:   Current Medications[1]    ALLERGIES:   Review of patient's allergies indicates:  No Known Allergies    Review of Systems:  Constitution: Negative for chills, fever and night sweats.   HENT: Negative for congestion and headaches.    Eyes: Negative for blurred vision or vision loss.  Cardiovascular: Negative for chest pain and syncope.   Respiratory: Negative for cough and shortness of breath.    Endocrine: Negative for polydipsia, polyphagia and polyuria.   Hematologic/Lymphatic: Negative for bleeding problem. Does not bruise/bleed easily.   Skin: Negative for dry skin, itching and rash.   Musculoskeletal: See HPI.   Gastrointestinal: Negative for abdominal pain and bowel incontinence.   Genitourinary: Negative for bladder incontinence and nocturia.   Neurological: Negative for disturbances in coordination, loss of balance and seizures.   Psychiatric/Behavioral: Negative for depression. The patient does not have insomnia.    Allergic/Immunologic: Negative for hives and persistent infections.           Objective:      There were no vitals filed for this visit.    PHYSICAL EXAM:  General: Alert & oriented x3, well-developed and well-nourished, in no acute distress, sitting comfortably in the exam room.  Skin: Warm and dry. Capillary refill less than 2 seconds.   Head: Normocephalic and atraumatic.   Eyes: Sclera appear normal.   Nose: No deformities seen.   Ears: No deformities seen.   Neck: No tracheal deviation present.   Pulmonary/Chest: Breathing unlabored.   Neurological: Alert and oriented to person, place, and time.   Psychiatric: Mood is pleasant and affect appropriate.     BILATERAL HANDS/WRISTS:  Observation/Inspection:    Mild swelling to the base of the bilateral thumbs.   Otherwise, no evidence of visible deformity, discoloration, scars, or atrophy.  Palpation:    Mild tenderness to palpation to the base of the thumbs bilaterally.  Otherwise, negative tenderness to palpation to bony prominences and soft tissues throughout.  Range of Motion:   Wrist: Full to wrist flexion, extension, radial, and ulnar deviation without pain or difficulty.   Digits: Full to digit MCP, PIP, and DIP flexion and extension without pain or difficulty.  Full to thumb opposition without pain or difficulty.    Special Tests:                 RIGHT         LEFT     Finkelstein's Test  Negative    Negative  Tinel's Test - Carpal Tunnel Negative    Negative   Tinel's Test - Cubital Tunnel Negative    Negative   CMC Grind    Positive     Positive   Strength:   Normal 5/5 strength in all tested muscle groups.   Neurovascular Exam:   Digits warm and well perfused, brisk capillary refill <3 seconds throughout.  NVI motor/LTS to median, radial, and ulnar nerves, radial pulse 2+.    Imaging:   X-Rays: 3 views of bilateral hand dated 03/27/2025, and independently reviewed, show: Degenerative changes of the bilateral 1st CMC joint including joint space narrowing with associated subluxation.  No acute fractures or dislocations. There is a  remote deformity of the left ulnar styloid process.        Assessment:       1. Arthritis of carpometacarpal (CMC) joint of left thumb    2. Arthritis of carpometacarpal (CMC) joint of right thumb        Plan:       Orders Placed This Encounter    diclofenac sodium (VOLTAREN) 1 % Gel     I explained the nature of the problem to the patient.     I discussed at length with the patient all the different treatment options available for her bilateral thumbs including anti-inflammatories, acetaminophen, rest, ice, heat, physical/occupational therapy, and corticosteroid injections. I explained the potential role of surgery in the treatment of this condition. The patient understands that if non-surgical measures do not adequately control symptoms, surgery will be considered in the future.     Medications:  Continue use of previously prescribed Voltaren Gel TID to affected area as needed for pain management. Refill provided today. Continue OTC Tylenol and/or NSAIDs as needed for pain management.  Procedures:  None today. Consider CSI if symptoms worsen.    DME:  New bilateral 3D braces provided today as patient has misplaced her other ones.  Patient to wear braces as needed with activities.  Pain Management:  Encouraged patient to apply ice and/or heat compress to the affected area 2-3x a day for 15-20 minutes as needed for pain management.    Follow-Up: 3-4 months for follow up.     All of the patient's questions were answered and the patient will contact us if they have any questions or concerns in the interim.    Sheeba Mcgregor PA-C  [facility]  Orthopedic Surgery    Medical Dictation software was used during the dictation of portions or the entirety of this medical record.  Phonetic or grammatic errors may exist due to the use of this software. For clarification, refer to the author of the document.             [1]   Current Outpatient Medications:     ergocalciferol (ERGOCALCIFEROL) 50,000 unit Cap, Take 50,000  Units by mouth every 7 days. Weekly Wednesday; instructed to hold for sx, Disp: , Rfl:     fluticasone propionate (FLONASE) 50 mcg/actuation nasal spray, 1 spray by Each Nostril route once daily., Disp: , Rfl:     multivit-min/iron/FA/vit K/lut (CENTRUM SILVER WOMEN ORAL), Take 2 tablets by mouth Daily. Gummies, instructed to hold for sx, Disp: , Rfl:     multivitamin with minerals (HAIR,SKIN AND NAILS ORAL), Take 2 tablets by mouth Daily. Gummies; instructed to hold for sx, Disp: , Rfl:     topiramate (TOPAMAX) 100 MG tablet, Take 100 mg by mouth once daily., Disp: , Rfl:     diclofenac sodium (VOLTAREN) 1 % Gel, Apply 2 g topically 3 (three) times daily. To affected area, Disp: 50 g, Rfl: 2    oxyCODONE-acetaminophen (PERCOCET) 7.5-325 mg per tablet, Take 1 tablet by mouth every 6 (six) hours as needed. (Patient not taking: Reported on 7/3/2025), Disp: 28 tablet, Rfl: 0